# Patient Record
Sex: MALE | Race: WHITE | URBAN - METROPOLITAN AREA
[De-identification: names, ages, dates, MRNs, and addresses within clinical notes are randomized per-mention and may not be internally consistent; named-entity substitution may affect disease eponyms.]

---

## 2017-03-07 ENCOUNTER — TELEPHONE (OUTPATIENT)
Dept: OPHTHALMOLOGY | Facility: CLINIC | Age: 37
End: 2017-03-07

## 2017-03-07 NOTE — TELEPHONE ENCOUNTER
----- Message from Cherelle Cerda sent at 3/7/2017 10:09 AM CST -----  Contact: Joshua Hayward   Pt would like to speak with  nurse about the contact lens pt can be reached at [phone] please thanks.

## 2017-03-08 ENCOUNTER — TELEPHONE (OUTPATIENT)
Dept: OPTOMETRY | Facility: CLINIC | Age: 37
End: 2017-03-08

## 2017-03-08 NOTE — TELEPHONE ENCOUNTER
----- Message from Vanita Almazan sent at 3/7/2017  4:07 PM CST -----  Contact: Joshua Hayward       ----- Message -----     From: Cherelle Cerda     Sent: 3/7/2017  10:09 AM       To: Ceci VILLANUEVA Staff    Pt would like to speak with  nurse about the contact lens pt can be reached at [phone] please thanks.